# Patient Record
Sex: FEMALE | URBAN - METROPOLITAN AREA
[De-identification: names, ages, dates, MRNs, and addresses within clinical notes are randomized per-mention and may not be internally consistent; named-entity substitution may affect disease eponyms.]

---

## 2017-02-02 ENCOUNTER — HOSPITAL ENCOUNTER (OUTPATIENT)
Dept: LAB | Age: 27
Discharge: HOME | End: 2017-02-02
Payer: COMMERCIAL

## 2017-02-02 PROCEDURE — 88175 CYTOPATH C/V AUTO FLUID REDO: CPT | Performed by: OBSTETRICS & GYNECOLOGY

## 2023-09-25 ENCOUNTER — HOSPITAL ENCOUNTER (OUTPATIENT)
Facility: HOSPITAL | Age: 33
Setting detail: RECURRING SERIES
Discharge: HOME | End: 2023-09-28
Payer: COMMERCIAL

## 2023-09-25 PROCEDURE — 97110 THERAPEUTIC EXERCISES: CPT

## 2023-09-25 PROCEDURE — 97162 PT EVAL MOD COMPLEX 30 MIN: CPT

## 2023-09-25 PROCEDURE — 97140 MANUAL THERAPY 1/> REGIONS: CPT

## 2023-09-25 NOTE — PROGRESS NOTES
PT DAILY TREATMENT NOTE/KNEE EVAL       Patient Name: Manav Trevizo    Date: 2023    : 1990  Insurance: Payor: Willi [employer] / Plan: [employer] [facility] / Product Type: *No Product type* /      Patient  verified yes     Visit #   Current / Total 1 12   Time   In / Out 915 1010   Pain   In / Out 2 2   Subjective Functional Status/Changes: Right knee pain that began about 6 weeks ago that her MD informed her that she had early stages of arthritis. She originally thought it was because she started going to the gym. Did lots of stretches and pain decreased some. Pain returned. Currently having pain with stairs and walking. No pain during walking but after walking. Symptoms are decreased currently because she has avoided increased activity. She has lost 55lbs. Treatment Area: Right knee pain [M25.561]    SUBJECTIVE  Pain Level (0-10 scale): 2  PMHx/Surgical Hx: HTN, BMI 30+  Work Hx: [address] Sedentary FT   Pt Goals: \"I want to be able to resume my exercise\"      32 min [x]Eval  - untimed                   Therapeutic Procedures: Tx Min Billable or 1:1 Min (if diff from Tx Min) Procedure, Rationale, Specifics   15  95276 Therapeutic Exercise (timed):  increase ROM, strength, coordination, balance, and proprioception to improve patient's ability to progress to PLOF and address remaining functional goals. (see flow sheet as applicable)     Details if applicable:     8  98178 Manual Therapy (timed):  decrease pain, increase ROM, and increase tissue extensibility to improve patient's ability to progress to PLOF and address remaining functional goals. The manual therapy interventions were performed at a separate and distinct time from the therapeutic activities interventions .  (see flow sheet as applicable)     Details if applicable:  IASTM of right quadricep in supine   Total  23 Total Reminder: MC/BC bill using total billable min of TIMED therapeutic procedures (example: do not include dry
Plan may include any combination of the followin Therapeutic Exercise, 68537 Neuromuscular Re-Education, 05683 Manual Therapy, 70658 Therapeutic Activity, 43259 Self Care/Home Management, 94743 Electrical Stim unattended, 58140 Electrical Stim attended, 62341 Vasopneumatic Device  (Vasopnuematic compression justification:  Per bilateral girth measures taken and listed above the edema is considered significant and having an impact on the patient's strength, balance, and gait), 02239 Gait Training, and 83055 Ultrasound  Patient / Family readiness to learn indicated by: asking questions, trying to perform skills, and interest  Persons(s) to be included in education: patient (P)  Barriers to Learning/Limitations: none  Measures taken if barriers to learning present: NA  Patient Goal (s): \"I want to be able to resume my exercise\"  Patient Self Reported Health Status: fair  Rehabilitation Potential: good    Short Term Goals: To be accomplished in 4 weeks   Pt will be compliant with HEP for symptom management at home. Status at IE HEP provided  2. Pt will demonstrate right hip ER AROM to at least 20 degrees to improve stance phase. Status at IE 16 degrees  3. Pt will demonstrate knee flexion AROM to at least 102 to improve gait training. Status at IE 96 degrees  Long Term Goals: To be accomplished in 8 weeks   Pt will be independent with HEP at D/C for self management. Status at IE HEP provided  2. Pt will demonstrate no pain with descending stairs to engage in age appropriate activities. Status at IE pain reported  3. Pt will demonstrate x10 squats without pain to return to previous level of function  Status at IE unable to squat without pain  4. Pt will increase FOTO Functional Status score to 74/100 to decrease functional limitations. Status at IE 60/100    Frequency / Duration: Patient would benefit from skilled PT 1-3 times per week for up to 36 sessions as needed in this certification period.